# Patient Record
(demographics unavailable — no encounter records)

---

## 2024-11-08 NOTE — HISTORY OF PRESENT ILLNESS
[FreeTextEntry1] : 66-year-old with PSA of 8.2,30 cc prostate with a PI-RADS 2 lesion, status post transperineal biopsy showing 1 out of 12 cores positive for [place] 8 adenocarcinoma.  He feels well.

## 2024-11-08 NOTE — ASSESSMENT
[FreeTextEntry1] : [place] 8 adenocarcinoma.  Discussed fully with patient.  Will get PSMA PET scan to check for any metastatic disease.  Return to office to discuss.

## 2024-12-03 NOTE — HISTORY OF PRESENT ILLNESS
[FreeTextEntry1] : 66-year-old with PSA of 8.2, 30 cc prostate status post transperineal biopsy showing 1 out of 12 cores positive for [place] 8 adenocarcinoma.  He underwent a PSMA PET scan showing no metastatic disease.  He feels well Dr. Castellanos

## 2024-12-03 NOTE — ASSESSMENT
[FreeTextEntry1] : [place] 8 prostate cancer localized to the prostate.  I discussed with the several options.  I do not recommend focal therapy for his risk category.  I do not recommend active surveillance either for this risk category.  I do not think he is a good candidate for robotic surgery as he has significant cardiac history.  I discussed with him radiation therapy and he have referred him to Dr. Gill for consultation.

## 2024-12-20 NOTE — HISTORY OF PRESENT ILLNESS
[FreeTextEntry1] : ETELVINA VARGAS is 66 year old man presenting today for initial consultation for prostate cancer. He has h/o of MI x 2 and CABG surgery in 2017. He had a rise in his routine PSA in May/2024 that was 5.87ng/ml and then on 9/7/2024 he was 8.2ng/ml. He went on to have an MRI of the prostate. MRI of the prostate on 8/28/2024 showed a 30.6cc prostate , and a PIRADS 2 lesion on posteriolateral peripheral zone. Prostate biopsy on 10/28/2024 with  showed one out of 12 cores with [place] 8(4+4) Pca. He had a PSMA and it showed uptake in prostate and no other sites of uptake. He has minimal bothersome urinary symptoms. He has 2-3 episodes of nocturia and IPSS score of 6.    PSA level 9/7/2024: 8.2ng/ml  5/11/2024: 5.87ng/ml 5/13/2023 3.84ng/ml   MRI prostate 8/28/2024: Impression: 6x6 mm left posterolateral midgland peripheral zone lesion. PI-RADS 2, low (clinically significant cancer unlikely) No evidence of extraprostatic extension, seminal vesicle invasion, or pathologic lymphadenopathy.   Prostate Biopsy 10/28/2024: Final Diagnosis 1. Prostate, right medial apex, needle biopsy -Prostate tissue with a small focus of atypical glands. 2. Prostate, right medial base, needle biopsy -Benign prostate tissue. 3. Prostate, right lateral apex, needle biopsy -Prostate tissue with a small focus of atypical glands. 4. Prostate, right lateral base, needle biopsy -Benign prostate tissue. 5. Prostate, right lateral, needle biopsy -Benign prostate tissue. 6. Prostate, right anterior, needle biopsy -Benign prostate tissue. 7. Prostate, left medial apex, needle biopsy -Prostate tissue with a small focus of atypical glands. 8. Prostate, left medial base, needle biopsy -Benign prostate tissue. 9. Prostate, left lateral apex, needle biopsy -Adenocarcinoma of the prostate, Prognostic Grade Group 4 ([place] score 4+4=8) involving 40% (5.5 mm in length) of 1 of 1 core(s). 10. Prostate, left lateral base, needle biopsy -Benign prostate tissue. 11. Prostate, left lateral, needle biopsy -Benign prostate tissue. 12. Prostate, left anterior, needle biopsy -Benign prostate tissue.  PSMA 11/22/2024:  IMPRESSION: Focal linear 1+ PSMA uptake which spans the left posterior prostate gland , max SUV 4.3 (image 113). No other sites of increased PSMA uptake

## 2024-12-20 NOTE — HISTORY OF PRESENT ILLNESS
[FreeTextEntry1] : ETELVINA VARGAS is 66 year old man presenting today for initial consultation for prostate cancer. He has h/o of MI x 2 and CABG surgery in 2017. He had a rise in his routine PSA in May/2024 that was 5.87ng/ml and then on 9/7/2024 he was 8.2ng/ml. He went on to have an MRI of the prostate. MRI of the prostate on 8/28/2024 showed a 30.6cc prostate , and a PIRADS 2 lesion on posteriolateral peripheral zone. Prostate biopsy on 10/28/2024 with  showed one out of 12 cores with [place] 8(4+4) Pca. He had a PSMA and it showed uptake in prostate and no other sites of uptake. He has minimal bothersome urinary symptoms. He has 2-3 episodes of nocturia and IPSS score of 6.    PSA level 9/7/2024: 8.2ng/ml  5/11/2024: 5.87ng/ml 5/13/2023 3.84ng/ml   MRI prostate 8/28/2024: Impression: 6x6 mm left posterolateral midgland peripheral zone lesion. PI-RADS 2, low (clinically significant cancer unlikely) No evidence of extraprostatic extension, seminal vesicle invasion, or pathologic lymphadenopathy.   Prostate Biopsy 10/28/2024: Final Diagnosis 1. Prostate, right medial apex, needle biopsy -Prostate tissue with a small focus of atypical glands. 2. Prostate, right medial base, needle biopsy -Benign prostate tissue. 3. Prostate, right lateral apex, needle biopsy -Prostate tissue with a small focus of atypical glands. 4. Prostate, right lateral base, needle biopsy -Benign prostate tissue. 5. Prostate, right lateral, needle biopsy -Benign prostate tissue. 6. Prostate, right anterior, needle biopsy -Benign prostate tissue. 7. Prostate, left medial apex, needle biopsy -Prostate tissue with a small focus of atypical glands. 8. Prostate, left medial base, needle biopsy -Benign prostate tissue. 9. Prostate, left lateral apex, needle biopsy -Adenocarcinoma of the prostate, Prognostic Grade Group 4 ([place] score 4+4=8) involving 40% (5.5 mm in length) of 1 of 1 core(s). 10. Prostate, left lateral base, needle biopsy -Benign prostate tissue. 11. Prostate, left lateral, needle biopsy -Benign prostate tissue. 12. Prostate, left anterior, needle biopsy -Benign prostate tissue.  PSMA 11/22/2024:  IMPRESSION: Focal linear 1+ PSMA uptake which spans the left posterior prostate gland , max SUV 4.3 (image 113). No other sites of increased PSMA uptake

## 2024-12-20 NOTE — REVIEW OF SYSTEMS
[Fatigue] : fatigue [Wheezing] : wheezing [Joint Pain] : joint pain [Negative] : Allergic/Immunologic [IPSS Score (0-40): ___] : IPSS score: [unfilled] [EPIC-CP Score (0-60): ___] : EPIC-CP score: [unfilled] [SOB on Exertion] : shortness of breath during exertion [Shortness Of Breath] : no shortness of breath [FreeTextEntry5] : Triple bypass surgery in 2017 [FreeTextEntry6] : asthma as a child [FreeTextEntry7] : Colorguard/ done/ never had colonoscopy [FreeTextEntry8] : 2 -3 times nocturia [FreeTextEntry9] : arthritis

## 2024-12-20 NOTE — DISEASE MANAGEMENT
[Biopsy with Fusion] : Patient had a biopsy with fusion on [2] : 2 [IIC] : IIC [0] : N0 [X] : MX [0-10] : 0 -10 ng/mL [8] : Fusion Biopsy [place] Score: 8 [] : Patient had no Bone Scan performed [BiopsyDate] : 10/24 [MeasuredProstateVolume] : 30.6 [TotalCores] : 12 [TotalPositiveCores] : 1 [MaxCoreInvolvement] : 40

## 2024-12-20 NOTE — HISTORY OF PRESENT ILLNESS
[FreeTextEntry1] : ETELVINA VARGAS is 66 year old man presenting today for initial consultation for prostate cancer. He has h/o of MI x 2 and CABG surgery in 2017. He had a rise in his routine PSA in May/2024 that was 5.87ng/ml and then on 9/7/2024 he was 8.2ng/ml. He went on to have an MRI of the prostate. MRI of the prostate on 8/28/2024 showed a 30.6cc prostate , and a PIRADS 2 lesion on posteriolateral peripheral zone. Prostate biopsy on 10/28/2024 with  showed one out of 12 cores with [place] 8(4+4) Pca. He had a PSMA and it showed uptake in prostate and no other sites of uptake. He has minimal bothersome urinary symptoms. He has 2-3 episodes of nocturia and IPSS score of 6.    PSA level 9/7/2024: 8.2ng/ml  5/11/2024: 5.87ng/ml 5/13/2023 3.84ng/ml   MRI prostate 8/28/2024: Impression: 6x6 mm left posterolateral midgland peripheral zone lesion. PI-RADS 2, low (clinically significant cancer unlikely) No evidence of extraprostatic extension, seminal vesicle invasion, or pathologic lymphadenopathy.   Prostate Biopsy 10/28/2024: Final Diagnosis 1. Prostate, right medial apex, needle biopsy -Prostate tissue with a small focus of atypical glands. 2. Prostate, right medial base, needle biopsy -Benign prostate tissue. 3. Prostate, right lateral apex, needle biopsy -Prostate tissue with a small focus of atypical glands. 4. Prostate, right lateral base, needle biopsy -Benign prostate tissue. 5. Prostate, right lateral, needle biopsy -Benign prostate tissue. 6. Prostate, right anterior, needle biopsy -Benign prostate tissue. 7. Prostate, left medial apex, needle biopsy -Prostate tissue with a small focus of atypical glands. 8. Prostate, left medial base, needle biopsy -Benign prostate tissue. 9. Prostate, left lateral apex, needle biopsy -Adenocarcinoma of the prostate, Prognostic Grade Group 4 ([place] score 4+4=8) involving 40% (5.5 mm in length) of 1 of 1 core(s). 10. Prostate, left lateral base, needle biopsy -Benign prostate tissue. 11. Prostate, left lateral, needle biopsy -Benign prostate tissue. 12. Prostate, left anterior, needle biopsy -Benign prostate tissue.  PSMA 11/22/2024:  IMPRESSION: Focal linear 1+ PSMA uptake which spans the left posterior prostate gland , max SUV 4.3 (image 113). No other sites of increased PSMA uptake

## 2025-02-25 NOTE — HISTORY OF PRESENT ILLNESS
[FreeTextEntry1] : 2/21/25: Mr. Riddle returns to discuss RT and spaceOAR. He received ADT on 1/21/25    ETELVINA RIDDLE is 66 year old man presenting today for initial consultation for prostate cancer. He has h/o of MI x 2 and CABG surgery in 2017. He had a rise in his routine PSA in May/2024 that was 5.87ng/ml and then on 9/7/2024 he was 8.2ng/ml. He went on to have an MRI of the prostate. MRI of the prostate on 8/28/2024 showed a 30.6cc prostate , and a PIRADS 2 lesion on posteriolateral peripheral zone. Prostate biopsy on 10/28/2024 with  showed one out of 12 cores with [place] 8(4+4) Pca. He had a PSMA and it showed uptake in prostate and no other sites of uptake. He has minimal bothersome urinary symptoms. He has 2-3 episodes of nocturia and IPSS score of 6.   PSA level 9/7/2024: 8.2ng/ml 5/11/2024: 5.87ng/ml 5/13/2023 3.84ng/ml   MRI prostate 8/28/2024: Impression: 6x6 mm left posterolateral midgland peripheral zone lesion. PI-RADS 2, low (clinically significant cancer unlikely) No evidence of extraprostatic extension, seminal vesicle invasion, or pathologic lymphadenopathy.  Prostate Biopsy 10/28/2024: Final Diagnosis 1. Prostate, right medial apex, needle biopsy -Prostate tissue with a small focus of atypical glands. 2. Prostate, right medial base, needle biopsy -Benign prostate tissue. 3. Prostate, right lateral apex, needle biopsy -Prostate tissue with a small focus of atypical glands. 4. Prostate, right lateral base, needle biopsy -Benign prostate tissue. 5. Prostate, right lateral, needle biopsy -Benign prostate tissue. 6. Prostate, right anterior, needle biopsy -Benign prostate tissue. 7. Prostate, left medial apex, needle biopsy -Prostate tissue with a small focus of atypical glands. 8. Prostate, left medial base, needle biopsy -Benign prostate tissue. 9. Prostate, left lateral apex, needle biopsy -Adenocarcinoma of the prostate, Prognostic Grade Group 4 ([place] score 4+4=8) involving 40% (5.5 mm in length) of 1 of 1 core(s). 10. Prostate, left lateral base, needle biopsy -Benign prostate tissue. 11. Prostate, left lateral, needle biopsy -Benign prostate tissue. 12. Prostate, left anterior, needle biopsy -Benign prostate tissue.  PSMA 11/22/2024: IMPRESSION: Focal linear 1+ PSMA uptake which spans the left posterior prostate gland , max SUV 4.3 (image 113). No other sites of increased PSMA uptake  Disease Management     Diagnosis: N0 MX   PSA Level at Diagnosis: 0 -10 ng/mL   Biopsy: Patient had a biopsy with fusion on 10/24. Measured Prostate Volume (mL): 30.6. Total number of cores at biopsy: 12. Total # of positives cores: 1. Max % Core Involvement: 40. Fusion Biopsy [place] Score: 8.   Patient had no CT scan performed.   Patient had no Bone Scan performed. Patient had a Prostate MRI. PI-RADS: 2.    AJCC Staging:   AJCC Stage (8th Ed): IIC.

## 2025-02-25 NOTE — PHYSICAL EXAM
[Normal] : well developed, well nourished, in no acute distress Ear Star Wedge Flap Text: The defect edges were debeveled with a #15 blade scalpel.  Given the location of the defect and the proximity to free margins (helical rim) an ear star wedge flap was deemed most appropriate.  Using a sterile surgical marker, the appropriate flap was drawn incorporating the defect and placing the expected incisions between the helical rim and antihelix where possible.  The area thus outlined was incised through and through with a #15 scalpel blade.

## 2025-02-25 NOTE — HISTORY OF PRESENT ILLNESS
[FreeTextEntry1] : 2/21/25: Mr. Riddle returns to discuss RT and spaceOAR. He received ADT on 1/21/25    ETELVINA RIDDLE is 66 year old man presenting today for initial consultation for prostate cancer. He has h/o of MI x 2 and CABG surgery in 2017. He had a rise in his routine PSA in May/2024 that was 5.87ng/ml and then on 9/7/2024 he was 8.2ng/ml. He went on to have an MRI of the prostate. MRI of the prostate on 8/28/2024 showed a 30.6cc prostate , and a PIRADS 2 lesion on posteriolateral peripheral zone. Prostate biopsy on 10/28/2024 with  showed one out of 12 cores with [place] 8(4+4) Pca. He had a PSMA and it showed uptake in prostate and no other sites of uptake. He has minimal bothersome urinary symptoms. He has 2-3 episodes of nocturia and IPSS score of 6.   PSA level 9/7/2024: 8.2ng/ml 5/11/2024: 5.87ng/ml 5/13/2023 3.84ng/ml   MRI prostate 8/28/2024: Impression: 6x6 mm left posterolateral midgland peripheral zone lesion. PI-RADS 2, low (clinically significant cancer unlikely) No evidence of extraprostatic extension, seminal vesicle invasion, or pathologic lymphadenopathy.  Prostate Biopsy 10/28/2024: Final Diagnosis 1. Prostate, right medial apex, needle biopsy -Prostate tissue with a small focus of atypical glands. 2. Prostate, right medial base, needle biopsy -Benign prostate tissue. 3. Prostate, right lateral apex, needle biopsy -Prostate tissue with a small focus of atypical glands. 4. Prostate, right lateral base, needle biopsy -Benign prostate tissue. 5. Prostate, right lateral, needle biopsy -Benign prostate tissue. 6. Prostate, right anterior, needle biopsy -Benign prostate tissue. 7. Prostate, left medial apex, needle biopsy -Prostate tissue with a small focus of atypical glands. 8. Prostate, left medial base, needle biopsy -Benign prostate tissue. 9. Prostate, left lateral apex, needle biopsy -Adenocarcinoma of the prostate, Prognostic Grade Group 4 ([place] score 4+4=8) involving 40% (5.5 mm in length) of 1 of 1 core(s). 10. Prostate, left lateral base, needle biopsy -Benign prostate tissue. 11. Prostate, left lateral, needle biopsy -Benign prostate tissue. 12. Prostate, left anterior, needle biopsy -Benign prostate tissue.  PSMA 11/22/2024: IMPRESSION: Focal linear 1+ PSMA uptake which spans the left posterior prostate gland , max SUV 4.3 (image 113). No other sites of increased PSMA uptake  Disease Management     Diagnosis: N0 MX   PSA Level at Diagnosis: 0 -10 ng/mL   Biopsy: Patient had a biopsy with fusion on 10/24. Measured Prostate Volume (mL): 30.6. Total number of cores at biopsy: 12. Total # of positives cores: 1. Max % Core Involvement: 40. Fusion Biopsy [place] Score: 8.   Patient had no CT scan performed.   Patient had no Bone Scan performed. Patient had a Prostate MRI. PI-RADS: 2.    AJCC Staging:   AJCC Stage (8th Ed): IIC.

## 2025-02-25 NOTE — DISEASE MANAGEMENT
[Clinical] : TNM Stage: c [IIC] : IIC [FreeTextEntry4] : prostate cancer [TTNM] : 1c [NTNM] : 0 [MTNM] : 0

## 2025-03-24 NOTE — DISEASE MANAGEMENT
[Clinical] : TNM Stage: c [FreeTextEntry4] : prostate cancer [TTNM] : 1c [NTNM] : 0 [MTNM] : 0 [IIC] : IIC [de-identified] : 500cGy [de-identified] : 7000cGy [de-identified] : Prostate/SV/Nodes

## 2025-03-24 NOTE — DISEASE MANAGEMENT
[Clinical] : TNM Stage: c [FreeTextEntry4] : prostate cancer [TTNM] : 1c [NTNM] : 0 [MTNM] : 0 [IIC] : IIC [de-identified] : 500cGy [de-identified] : 7000cGy [de-identified] : Prostate/SV/Nodes

## 2025-03-24 NOTE — DISEASE MANAGEMENT
[Clinical] : TNM Stage: c [FreeTextEntry4] : prostate cancer [TTNM] : 1c [NTNM] : 0 [MTNM] : 0 [IIC] : IIC [de-identified] : 500cGy [de-identified] : 7000cGy [de-identified] : Prostate/SV/Nodes

## 2025-03-25 NOTE — REVIEW OF SYSTEMS
[Constipation: Grade 0] : Constipation: Grade 0 [Diarrhea: Grade 0] : Diarrhea: Grade 0 [Nausea: Grade 0] : Nausea: Grade 0 [Vomiting: Grade 0] : Vomiting: Grade 0 [Fatigue: Grade 0] : Fatigue: Grade 0 [Urinary Tract Pain: Grade 0] : Urinary Tract Pain: Grade 0 [Urinary Urgency: Grade 1 - Present] : Urinary Urgency: Grade 1 - Present [Urinary Frequency: Grade 1 - Present] : Urinary Frequency: Grade 1 - Present

## 2025-04-01 NOTE — HISTORY OF PRESENT ILLNESS
[FreeTextEntry1] : 4/1/2025 OTV 7/28 treatments to the prostate/SV/nodes: Patient feels well. He has mild fatigue. He has hot flashes with his ADT. His first ADT was 1/21/25 with Urology (recommended 1.5 yrs). He has been doing enemas in am on most days before treatment to help with a daily BM. He experienced some mild burning with urination over the weekend. We will get UA and culture today and he was advised to try AZO and/or Motrin for discomfort if it happens again. Mild fatigue.   3/25/2025 OTV: 2/28 treatments to the prostate/SV/Nodes: Patient reports doing well. He denies burning with urination, urinary urgency or bowel changes. He notes frequency since starting Valsartan. He is taking Ex-lax at night. He is getting ADT with Dr. Avalos, next injection 4/23/2025.

## 2025-04-01 NOTE — REVIEW OF SYSTEMS
[Constipation: Grade 0] : Constipation: Grade 0 [Diarrhea: Grade 0] : Diarrhea: Grade 0 [Nausea: Grade 0] : Nausea: Grade 0 [Vomiting: Grade 0] : Vomiting: Grade 0 [Fatigue: Grade 0] : Fatigue: Grade 0 [Urinary Tract Pain: Grade 0] : Urinary Tract Pain: Grade 0 [Urinary Urgency: Grade 1 - Present] : Urinary Urgency: Grade 1 - Present [Urinary Frequency: Grade 1 - Present] : Urinary Frequency: Grade 1 - Present [Fatigue: Grade 1 - Fatigue relieved by rest] : Fatigue: Grade 1 - Fatigue relieved by rest [Urinary Incontinence: Grade 0] : Urinary Incontinence: Grade 0  [Urinary Retention: Grade 0] : Urinary Retention: Grade 0 [Urinary Tract Pain: Grade 1 - Mild pain] : Urinary Tract Pain: Grade 1 - Mild pain

## 2025-04-01 NOTE — DISEASE MANAGEMENT
[Clinical] : TNM Stage: c [IIC] : IIC [FreeTextEntry4] : prostate cancer [TTNM] : 1c [NTNM] : 0 [MTNM] : 0 [de-identified] : 2871cGy [de-identified] : 7000cGy [de-identified] : Prostate/SV/Nodes

## 2025-04-01 NOTE — PHYSICAL EXAM
[General Appearance - Well Developed] : well developed [General Appearance - Alert] : alert [General Appearance - In No Acute Distress] : in no acute distress [] : no respiratory distress [Normal] : no joint swelling, no clubbing or cyanosis of the fingernails and muscle strength and tone were normal [Oriented To Time, Place, And Person] : oriented to person, place, and time

## 2025-04-07 NOTE — DISEASE MANAGEMENT
[Clinical] : TNM Stage: c [FreeTextEntry4] : prostate cancer [TTNM] : 1c [NTNM] : 0 [MTNM] : 0 [IIC] : IIC [de-identified] : 3000cGy [de-identified] : 7000cGy [de-identified] : Prostate/SV/Nodes

## 2025-04-07 NOTE — DISEASE MANAGEMENT
[Clinical] : TNM Stage: c [FreeTextEntry4] : prostate cancer [TTNM] : 1c [NTNM] : 0 [MTNM] : 0 [IIC] : IIC [de-identified] : 3000cGy [de-identified] : 7000cGy [de-identified] : Prostate/SV/Nodes

## 2025-04-07 NOTE — REVIEW OF SYSTEMS
[Constipation: Grade 0] : Constipation: Grade 0 [Diarrhea: Grade 0] : Diarrhea: Grade 0 [Nausea: Grade 0] : Nausea: Grade 0 [Vomiting: Grade 0] : Vomiting: Grade 0 [Fatigue: Grade 1 - Fatigue relieved by rest] : Fatigue: Grade 1 - Fatigue relieved by rest [Urinary Incontinence: Grade 0] : Urinary Incontinence: Grade 0  [Urinary Retention: Grade 0] : Urinary Retention: Grade 0 [Urinary Tract Pain: Grade 1 - Mild pain] : Urinary Tract Pain: Grade 1 - Mild pain [Urinary Urgency: Grade 1 - Present] : Urinary Urgency: Grade 1 - Present [Urinary Frequency: Grade 1 - Present] : Urinary Frequency: Grade 1 - Present

## 2025-04-07 NOTE — HISTORY OF PRESENT ILLNESS
[FreeTextEntry1] : 4/8/2025 OTV 12/28 treatments to the prostate/SV/nodes:  4/1/2025 OTV 7/28 treatments to the prostate/SV/nodes: Patient feels well. He has mild fatigue. He has hot flashes with his ADT. His first ADT was 1/21/25 with Urology (recommended 1.5 yrs). He has been doing enemas in am on most days before treatment to help with a daily BM. He experienced some mild burning with urination over the weekend. We will get UA and culture today and he was advised to try AZO and/or Motrin for discomfort if it happens again. Mild fatigue.   3/25/2025 OTV: 2/28 treatments to the prostate/SV/Nodes: Patient reports doing well. He denies burning with urination, urinary urgency or bowel changes. He notes frequency since starting Valsartan. He is taking Ex-lax at night. He is getting ADT with Dr. Avalos, next injection 4/23/2025.

## 2025-04-14 NOTE — REVIEW OF SYSTEMS
[Constipation: Grade 0] : Constipation: Grade 0 [Diarrhea: Grade 0] : Diarrhea: Grade 0 [Fatigue: Grade 1 - Fatigue relieved by rest] : Fatigue: Grade 1 - Fatigue relieved by rest [Urinary Incontinence: Grade 0] : Urinary Incontinence: Grade 0  [Urinary Retention: Grade 0] : Urinary Retention: Grade 0 [Urinary Tract Pain: Grade 1 - Mild pain] : Urinary Tract Pain: Grade 1 - Mild pain [Urinary Urgency: Grade 1 - Present] : Urinary Urgency: Grade 1 - Present [Urinary Frequency: Grade 1 - Present] : Urinary Frequency: Grade 1 - Present

## 2025-04-14 NOTE — REASON FOR VISIT
[FreeTextEntry3] : I personally saw and examined MELANI BROTHERS in detail. I spoke to FRANCESCA Baeza regarding the assessment and plan of care. I reviewed the above assessment and plan of care, and agree. I have made changes in changes in the body of the note where appropriate.I personally reviewed the HPI, PMH, FH, SH, ROS and medications/allergies. I have spoken to FRANCESCA Baeza regarding the history and have personally determined the assessment and plan of care, and documented this myself. I was present and participated in all key portions of the encounter and all procedures noted above. I have made changes in the body of the note where appropriate.\par \par Attesting Faculty: See Attending Signature Below \par \par \par  [Routine On-Treatment] : a routine on-treatment visit for [Prostate Cancer] : prostate cancer

## 2025-04-15 NOTE — HISTORY OF PRESENT ILLNESS
[FreeTextEntry1] : 4/15/2025 OTV: 17/28 treatments to the Prostate/SV/nodes. Patient feeling well. Mild fatigue. He continues Tamsulosin every night. It has helped his flow and frequency. He also takes his BP meds earlier, instead of at night, and that has improved his nocturia significantly. He has about 3 episodes of nocturia, instead of every hour at one point. He will be getting PSA checked today for urology team because he will get his 2nd ADT next week. He has been getting some abdominal cramps but no diarrhea.   4/8/2025 OTV 12/28 treatments to the prostate/SV/nodes: Patient reports frequent urination at night. Voiding every hour at night. He has weak urinary stream during the night. He states he has occasional mild burning with urination but does not feel needs Azo at this time. He picked up cranberry juice as instructed. Mild fatigue.  4/1/2025 OTV 7/28 treatments to the prostate/SV/nodes: Patient feels well. He has mild fatigue. He has hot flashes with his ADT. His first ADT was 1/21/25 with Urology (recommended 1.5 yrs). He has been doing enemas in am on most days before treatment to help with a daily BM. He experienced some mild burning with urination over the weekend. We will get UA and culture today and he was advised to try AZO and/or Motrin for discomfort if it happens again. Mild fatigue.   3/25/2025 OTV: 2/28 treatments to the prostate/SV/Nodes: Patient reports doing well. He denies burning with urination, urinary urgency or bowel changes. He notes frequency since starting Valsartan. He is taking Ex-lax at night. He is getting ADT with Dr. Avalos, next injection 4/23/2025.

## 2025-04-15 NOTE — DISEASE MANAGEMENT
[Clinical] : TNM Stage: c [IIC] : IIC [FreeTextEntry4] : prostate cancer [TTNM] : 1c [NTNM] : 0 [MTNM] : 0 [de-identified] : 7220zGy [de-identified] : 7000cGy [de-identified] : Prostate/SV/Nodes

## 2025-04-21 NOTE — HISTORY OF PRESENT ILLNESS
[FreeTextEntry1] :  4/21/2025 OTV: 21 treatments to the prostate/SV/nodes:   4/15/2025 OTV: 17/28 treatments to the Prostate/SV/nodes. Patient feeling well. Mild fatigue. He continues Tamsulosin every night. It has helped his flow and frequency. He also takes his BP meds earlier, instead of at night, and that has improved his nocturia significantly. He has about 3 episodes of nocturia, instead of every hour at one point. He will be getting PSA checked today for urology team because he will get his 2nd ADT next week. He has been getting some abdominal cramps but no diarrhea.   4/8/2025 OTV 12/28 treatments to the prostate/SV/nodes: Patient reports frequent urination at night. Voiding every hour at night. He has weak urinary stream during the night. He states he has occasional mild burning with urination but does not feel needs Azo at this time. He picked up cranberry juice as instructed. Mild fatigue.  4/1/2025 OTV 7/28 treatments to the prostate/SV/nodes: Patient feels well. He has mild fatigue. He has hot flashes with his ADT. His first ADT was 1/21/25 with Urology (recommended 1.5 yrs). He has been doing enemas in am on most days before treatment to help with a daily BM. He experienced some mild burning with urination over the weekend. We will get UA and culture today and he was advised to try AZO and/or Motrin for discomfort if it happens again. Mild fatigue.   3/25/2025 OTV: 2/28 treatments to the prostate/SV/Nodes: Patient reports doing well. He denies burning with urination, urinary urgency or bowel changes. He notes frequency since starting Valsartan. He is taking Ex-lax at night. He is getting ADT with Dr. Avalos, next injection 4/23/2025.

## 2025-04-21 NOTE — DISEASE MANAGEMENT
[Clinical] : TNM Stage: c [IIC] : IIC [FreeTextEntry4] : prostate cancer [TTNM] : 1c [NTNM] : 0 [MTNM] : 0 [de-identified] : 5250cGy [de-identified] : 7000cGy [de-identified] : Prostate/SV/Nodes

## 2025-04-21 NOTE — DISEASE MANAGEMENT
[Clinical] : TNM Stage: c [IIC] : IIC [FreeTextEntry4] : prostate cancer [TTNM] : 1c [NTNM] : 0 [MTNM] : 0 [de-identified] : 5250cGy [de-identified] : 7000cGy [de-identified] : Prostate/SV/Nodes

## 2025-04-22 NOTE — HISTORY OF PRESENT ILLNESS
[FreeTextEntry1] : 4/21/2025 OTV: 21 treatments to the prostate/SV/nodes: Patient feeling well. Mild fatigue. Urinary frequency is managed with Tamsulosin and taking his BP meds earlier. PSA last was down to 0.41ng/ml. It was checked before his next ADT which he will get Tomorrow with Urology. He had some softer stools over the weekend, but he believes it was the holiday weekend that contributed. He was provided with tips on low residue diet to help. He has 3 episodes of nocturia.      4/15/2025 OTV: 17/28 treatments to the Prostate/SV/nodes. Patient feeling well. Mild fatigue. He continues Tamsulosin every night. It has helped his flow and frequency. He also takes his BP meds earlier, instead of at night, and that has improved his nocturia significantly. He has about 3 episodes of nocturia, instead of every hour at one point. He will be getting PSA checked today for urology team because he will get his 2nd ADT next week. He has been getting some abdominal cramps but no diarrhea.   4/8/2025 OTV 12/28 treatments to the prostate/SV/nodes: Patient reports frequent urination at night. Voiding every hour at night. He has weak urinary stream during the night. He states he has occasional mild burning with urination but does not feel needs Azo at this time. He picked up cranberry juice as instructed. Mild fatigue.  4/1/2025 OTV 7/28 treatments to the prostate/SV/nodes: Patient feels well. He has mild fatigue. He has hot flashes with his ADT. His first ADT was 1/21/25 with Urology (recommended 1.5 yrs). He has been doing enemas in am on most days before treatment to help with a daily BM. He experienced some mild burning with urination over the weekend. We will get UA and culture today and he was advised to try AZO and/or Motrin for discomfort if it happens again. Mild fatigue.   3/25/2025 OTV: 2/28 treatments to the prostate/SV/Nodes: Patient reports doing well. He denies burning with urination, urinary urgency or bowel changes. He notes frequency since starting Valsartan. He is taking Ex-lax at night. He is getting ADT with Dr. Avalos, next injection 4/23/2025.

## 2025-04-22 NOTE — DISEASE MANAGEMENT
[FreeTextEntry4] : prostate cancer [TTNM] : 1c [NTNM] : 0 [MTNM] : 0 [de-identified] : 5250cGy [de-identified] : 7000cGy [de-identified] : Prostate/SV/Nodes

## 2025-04-29 NOTE — DISEASE MANAGEMENT
[Clinical] : TNM Stage: c [IIC] : IIC [FreeTextEntry4] : prostate cancer [TTNM] : 1c [NTNM] : 0 [MTNM] : 0 [de-identified] : 6750cGy [de-identified] : 7000cGy [de-identified] : Prostate/SV/Nodes

## 2025-04-29 NOTE — HISTORY OF PRESENT ILLNESS
[FreeTextEntry1] : 4/30/2025 OTV: 27 of 28 treatments to the Prostate/SV/Nodes: Mild fatigue. Patient reports some abdominal cramping. He is having 3 soft formed bowel movements per day. He is taking 2 Dulcolax and a fleet enema in the morning. Not always doing enema. He will stop bowel prep after completing radiation treatments tomorrow. He complains of urinary frequency, which he relates to his diuretics and hydration for treatment. Nocturia every hour. Taking tamsulosin 0.4mg 1 tab at night. He will try 2 Flomax at night to help.  4/21/2025 OTV: 21 treatments to the prostate/SV/nodes: Patient feeling well. Mild fatigue. Urinary frequency is managed with Tamsulosin and taking his BP meds earlier. PSA last was down to 0.41ng/ml. It was checked before his next ADT which he will get Tomorrow with Urology. He had some softer stools over the weekend, but he believes it was the holiday weekend that contributed. He was provided with tips on low residue diet to help. He has 3 episodes of nocturia.    4/15/2025 OTV: 17/28 treatments to the Prostate/SV/nodes. Patient feeling well. Mild fatigue. He continues Tamsulosin every night. It has helped his flow and frequency. He also takes his BP meds earlier, instead of at night, and that has improved his nocturia significantly. He has about 3 episodes of nocturia, instead of every hour at one point. He will be getting PSA checked today for urology team because he will get his 2nd ADT next week. He has been getting some abdominal cramps but no diarrhea.   4/8/2025 OTV 12/28 treatments to the prostate/SV/nodes: Patient reports frequent urination at night. Voiding every hour at night. He has weak urinary stream during the night. He states he has occasional mild burning with urination but does not feel needs Azo at this time. He picked up cranberry juice as instructed. Mild fatigue.  4/1/2025 OTV 7/28 treatments to the prostate/SV/nodes: Patient feels well. He has mild fatigue. He has hot flashes with his ADT. His first ADT was 1/21/25 with Urology (recommended 1.5 yrs). He has been doing enemas in am on most days before treatment to help with a daily BM. He experienced some mild burning with urination over the weekend. We will get UA and culture today and he was advised to try AZO and/or Motrin for discomfort if it happens again. Mild fatigue.   3/25/2025 OTV: 2/28 treatments to the prostate/SV/Nodes: Patient reports doing well. He denies burning with urination, urinary urgency or bowel changes. He notes frequency since starting Valsartan. He is taking Ex-lax at night. He is getting ADT with Dr. Avalos, next injection 4/23/2025.

## 2025-04-29 NOTE — REVIEW OF SYSTEMS
[Diarrhea: Grade 0] : Diarrhea: Grade 0 [Nausea: Grade 0] : Nausea: Grade 0 [Vomiting: Grade 0] : Vomiting: Grade 0 [Fatigue: Grade 1 - Fatigue relieved by rest] : Fatigue: Grade 1 - Fatigue relieved by rest [Urinary Tract Pain: Grade 0] : Urinary Tract Pain: Grade 0 [Urinary Urgency: Grade 1 - Present] : Urinary Urgency: Grade 1 - Present [Urinary Frequency: Grade 1 - Present] : Urinary Frequency: Grade 1 - Present [Urinary Incontinence: Grade 1 - Occasional (e.g., with coughing, sneezing, etc.), pads not indicated] : Urinary Incontinence: Grade 1 - Occasional (e.g., with coughing, sneezing, etc.), pads not indicated [FreeTextEntry2] : once or twice

## 2025-06-11 NOTE — HISTORY OF PRESENT ILLNESS
[FreeTextEntry1] : 2/21/25: Mr. Riddle returns to discuss RT and spaceOAR. He received ADT on 1/21/25.  ETELVINA RIDDLE is 66 year old man presenting today for initial consultation for prostate cancer. He has h/o of MI x 2 and CABG surgery in 2017. He had a rise in his routine PSA in May/2024 that was 5.87ng/ml and then on 9/7/2024 he was 8.2ng/ml. He went on to have an MRI of the prostate. MRI of the prostate on 8/28/2024 showed a 30.6cc prostate , and a PIRADS 2 lesion on posteriolateral peripheral zone. Prostate biopsy on 10/28/2024 with  showed one out of 12 cores with Dillon 8(4+4) Pca. He had a PSMA and it showed uptake in prostate and no other sites of uptake. He has minimal bothersome urinary symptoms. He has 2-3 episodes of nocturia and IPSS score of 6.   PSA level 9/7/2024: 8.2ng/ml 5/11/2024: 5.87ng/ml 5/13/2023 3.84ng/ml   MRI prostate 8/28/2024: Impression: 6x6 mm left posterolateral midgland peripheral zone lesion. PI-RADS 2, low (clinically significant cancer unlikely) No evidence of extraprostatic extension, seminal vesicle invasion, or pathologic lymphadenopathy.  Prostate Biopsy 10/28/2024: Final Diagnosis 1. Prostate, right medial apex, needle biopsy -Prostate tissue with a small focus of atypical glands. 2. Prostate, right medial base, needle biopsy -Benign prostate tissue. 3. Prostate, right lateral apex, needle biopsy -Prostate tissue with a small focus of atypical glands. 4. Prostate, right lateral base, needle biopsy -Benign prostate tissue. 5. Prostate, right lateral, needle biopsy -Benign prostate tissue. 6. Prostate, right anterior, needle biopsy -Benign prostate tissue. 7. Prostate, left medial apex, needle biopsy -Prostate tissue with a small focus of atypical glands. 8. Prostate, left medial base, needle biopsy -Benign prostate tissue. 9. Prostate, left lateral apex, needle biopsy -Adenocarcinoma of the prostate, Prognostic Grade Group 4 (Dillon score 4+4=8) involving 40% (5.5 mm in length) of 1 of 1 core(s). 10. Prostate, left lateral base, needle biopsy -Benign prostate tissue. 11. Prostate, left lateral, needle biopsy -Benign prostate tissue. 12. Prostate, left anterior, needle biopsy -Benign prostate tissue.  PSMA 11/22/2024: IMPRESSION: Focal linear 1+ PSMA uptake which spans the left posterior prostate gland , max SUV 4.3 (image 113). No other sites of increased PSMA uptake  Disease Management     Diagnosis: N0 MX   PSA Level at Diagnosis: 0 -10 ng/mL   Biopsy: Patient had a biopsy with fusion on 10/24. Measured Prostate Volume (mL): 30.6. Total number of cores at biopsy: 12. Total # of positives cores: 1. Max % Core Involvement: 40. Fusion Biopsy Dillon Score: 8.   Patient had no CT scan performed.   Patient had no Bone Scan performed. Patient had a Prostate MRI. PI-RADS: 2.    AJCC Staging:   AJCC Stage (8th Ed): IIC.

## 2025-06-11 NOTE — HISTORY OF PRESENT ILLNESS
[FreeTextEntry1] : 2/21/25: Mr. Riddle returns to discuss RT and spaceOAR. He received ADT on 1/21/25.  ETELVINA RIDDLE is 66 year old man presenting today for initial consultation for prostate cancer. He has h/o of MI x 2 and CABG surgery in 2017. He had a rise in his routine PSA in May/2024 that was 5.87ng/ml and then on 9/7/2024 he was 8.2ng/ml. He went on to have an MRI of the prostate. MRI of the prostate on 8/28/2024 showed a 30.6cc prostate , and a PIRADS 2 lesion on posteriolateral peripheral zone. Prostate biopsy on 10/28/2024 with  showed one out of 12 cores with Heidelberg 8(4+4) Pca. He had a PSMA and it showed uptake in prostate and no other sites of uptake. He has minimal bothersome urinary symptoms. He has 2-3 episodes of nocturia and IPSS score of 6.   PSA level 9/7/2024: 8.2ng/ml 5/11/2024: 5.87ng/ml 5/13/2023 3.84ng/ml   MRI prostate 8/28/2024: Impression: 6x6 mm left posterolateral midgland peripheral zone lesion. PI-RADS 2, low (clinically significant cancer unlikely) No evidence of extraprostatic extension, seminal vesicle invasion, or pathologic lymphadenopathy.  Prostate Biopsy 10/28/2024: Final Diagnosis 1. Prostate, right medial apex, needle biopsy -Prostate tissue with a small focus of atypical glands. 2. Prostate, right medial base, needle biopsy -Benign prostate tissue. 3. Prostate, right lateral apex, needle biopsy -Prostate tissue with a small focus of atypical glands. 4. Prostate, right lateral base, needle biopsy -Benign prostate tissue. 5. Prostate, right lateral, needle biopsy -Benign prostate tissue. 6. Prostate, right anterior, needle biopsy -Benign prostate tissue. 7. Prostate, left medial apex, needle biopsy -Prostate tissue with a small focus of atypical glands. 8. Prostate, left medial base, needle biopsy -Benign prostate tissue. 9. Prostate, left lateral apex, needle biopsy -Adenocarcinoma of the prostate, Prognostic Grade Group 4 (Heidelberg score 4+4=8) involving 40% (5.5 mm in length) of 1 of 1 core(s). 10. Prostate, left lateral base, needle biopsy -Benign prostate tissue. 11. Prostate, left lateral, needle biopsy -Benign prostate tissue. 12. Prostate, left anterior, needle biopsy -Benign prostate tissue.  PSMA 11/22/2024: IMPRESSION: Focal linear 1+ PSMA uptake which spans the left posterior prostate gland , max SUV 4.3 (image 113). No other sites of increased PSMA uptake  Disease Management     Diagnosis: N0 MX   PSA Level at Diagnosis: 0 -10 ng/mL   Biopsy: Patient had a biopsy with fusion on 10/24. Measured Prostate Volume (mL): 30.6. Total number of cores at biopsy: 12. Total # of positives cores: 1. Max % Core Involvement: 40. Fusion Biopsy Heidelberg Score: 8.   Patient had no CT scan performed.   Patient had no Bone Scan performed. Patient had a Prostate MRI. PI-RADS: 2.    AJCC Staging:   AJCC Stage (8th Ed): IIC.

## 2025-06-11 NOTE — PHYSICAL EXAM
[General Appearance - Well Developed] : well developed [General Appearance - In No Acute Distress] : in no acute distress [General Appearance - Alert] : alert [Examination Of The Oral Cavity] : the lips and gums were normal [Sclera] : the sclera and conjunctiva were normal [Normal] : supple with no thyromegaly or masses appreciated [] : no respiratory distress [Musculoskeletal - Swelling] : no joint swelling [Skin Color & Pigmentation] : normal skin color and pigmentation [No Focal Deficits] : no focal deficits [Oriented To Time, Place, And Person] : oriented to person, place, and time

## 2025-06-11 NOTE — PHYSICAL EXAM
[General Appearance - Well Developed] : well developed [General Appearance - Alert] : alert [General Appearance - In No Acute Distress] : in no acute distress [Sclera] : the sclera and conjunctiva were normal [Examination Of The Oral Cavity] : the lips and gums were normal [Normal] : supple with no thyromegaly or masses appreciated [] : no respiratory distress [Musculoskeletal - Swelling] : no joint swelling [No Focal Deficits] : no focal deficits [Skin Color & Pigmentation] : normal skin color and pigmentation [Oriented To Time, Place, And Person] : oriented to person, place, and time

## 2025-06-11 NOTE — DISEASE MANAGEMENT
[Clinical] : TNM Stage: c [IIC] : IIC [FreeTextEntry4] : prostate cancer [TTNM] : 1c [MTNM] : 0 [NTNM] : 0 [de-identified] : 7000cGy [de-identified] : 7000cGy [de-identified] : Prostate/SV/Nodes

## 2025-06-11 NOTE — DISEASE MANAGEMENT
[Clinical] : TNM Stage: c [IIC] : IIC [FreeTextEntry4] : prostate cancer [TTNM] : 1c [MTNM] : 0 [NTNM] : 0 [de-identified] : 7000cGy [de-identified] : 7000cGy [de-identified] : Prostate/SV/Nodes

## 2025-06-11 NOTE — REVIEW OF SYSTEMS
[Fatigue] : fatigue [Wheezing] : wheezing [SOB on Exertion] : shortness of breath during exertion [IPSS Score (0-40): ___] : IPSS score: [unfilled] [EPIC-CP Score (0-60): ___] : EPIC-CP score: [unfilled] [Joint Pain] : joint pain [FreeTextEntry7] : Colorguard/ done/ never had colonoscopy [Negative] : Neurological [Chest Pain] : no chest pain [Palpitations] : no palpitations [Leg Claudication] : no intermittent leg claudication [Lower Ext Edema] : no lower extremity edema [Shortness Of Breath] : no shortness of breath [FreeTextEntry6] : asthma as a child [FreeTextEntry5] : Triple bypass surgery in 2017/ follows up with cardiology in August/ had recent syncopal episode and meds were changed [FreeTextEntry2] : improving/ hot flashes with ADT [FreeTextEntry8] : 4 times nocturia [FreeTextEntry9] : arthritis

## 2025-06-11 NOTE — REVIEW OF SYSTEMS
[Fatigue] : fatigue [Wheezing] : wheezing [SOB on Exertion] : shortness of breath during exertion [IPSS Score (0-40): ___] : IPSS score: [unfilled] [EPIC-CP Score (0-60): ___] : EPIC-CP score: [unfilled] [Joint Pain] : joint pain [FreeTextEntry7] : Colorguard/ done/ never had colonoscopy [Negative] : Neurological [Palpitations] : no palpitations [Chest Pain] : no chest pain [Leg Claudication] : no intermittent leg claudication [Shortness Of Breath] : no shortness of breath [Lower Ext Edema] : no lower extremity edema [FreeTextEntry2] : improving/ hot flashes with ADT [FreeTextEntry6] : asthma as a child [FreeTextEntry5] : Triple bypass surgery in 2017/ follows up with cardiology in August/ had recent syncopal episode and meds were changed [FreeTextEntry8] : 4 times nocturia [FreeTextEntry9] : arthritis